# Patient Record
Sex: MALE | Race: WHITE
[De-identification: names, ages, dates, MRNs, and addresses within clinical notes are randomized per-mention and may not be internally consistent; named-entity substitution may affect disease eponyms.]

---

## 2019-06-05 ENCOUNTER — HOSPITAL ENCOUNTER (OUTPATIENT)
Dept: HOSPITAL 92 - SCSULT | Age: 59
Discharge: HOME | End: 2019-06-05
Attending: NURSE PRACTITIONER
Payer: COMMERCIAL

## 2019-06-05 DIAGNOSIS — R10.13: Primary | ICD-10-CM

## 2019-06-05 PROCEDURE — 76700 US EXAM ABDOM COMPLETE: CPT

## 2019-06-05 PROCEDURE — 36415 COLL VENOUS BLD VENIPUNCTURE: CPT

## 2019-06-05 PROCEDURE — 84439 ASSAY OF FREE THYROXINE: CPT

## 2019-06-05 PROCEDURE — 76705 ECHO EXAM OF ABDOMEN: CPT

## 2019-06-05 PROCEDURE — 84443 ASSAY THYROID STIM HORMONE: CPT

## 2019-06-05 PROCEDURE — 85025 COMPLETE CBC W/AUTO DIFF WBC: CPT

## 2019-06-05 PROCEDURE — 80053 COMPREHEN METABOLIC PANEL: CPT

## 2019-06-05 NOTE — ULT
Exam:

Right upper quadrant ultrasound:



HISTORY:

Abdominal pain



COMPARISON:

None



FINDINGS:

Visualized liver:Not well seen,

Gallbladder:Multiple mobile echogenic foci suspicious for small stones without wall thickening or per
icholecystic fluid.

Common bile duct:Within normal limits.

The visualized pancreas and right kidney are unremarkable.

No evidence for abscess or abnormal fluid collection in the right upper quadrant.





IMPRESSION:

Multiple mobile gallbladder foci suspicious for small gallstones.

Somewhat poorly demonstrated liver.

No ductal dilatation.



Reported By: Zaheer Bernardo 

Electronically Signed:  6/5/2019 2:11 PM

## 2019-06-12 ENCOUNTER — HOSPITAL ENCOUNTER (OUTPATIENT)
Dept: HOSPITAL 92 - LABBT | Age: 59
Discharge: HOME | End: 2019-06-12
Attending: SPECIALIST
Payer: COMMERCIAL

## 2019-06-12 DIAGNOSIS — Z01.818: Primary | ICD-10-CM

## 2019-06-12 PROCEDURE — 71046 X-RAY EXAM CHEST 2 VIEWS: CPT

## 2019-06-12 PROCEDURE — 93010 ELECTROCARDIOGRAM REPORT: CPT

## 2019-06-12 PROCEDURE — 93005 ELECTROCARDIOGRAM TRACING: CPT

## 2019-06-12 NOTE — RAD
EXAM:

Chest 2 views:



HISTORY:

Preoperative radiograph



COMPARISON:

8/17/2009



FINDINGS:

There is a normal-sized cardiomediastinal silhouette. There is no evidence of consolidation, mass, or
 pleural effusion. Degenerative changes are seen in the spine.



IMPRESSION:

No evidence of acute cardiopulmonary disease



Reported By: Trevor Ochoa 

Electronically Signed:  6/12/2019 5:00 PM

## 2019-06-14 NOTE — EKG
Test Reason : 

Blood Pressure : ***/*** mmHG

Vent. Rate : 065 BPM     Atrial Rate : 065 BPM

   P-R Int : 220 ms          QRS Dur : 092 ms

    QT Int : 370 ms       P-R-T Axes : 076 079 065 degrees

   QTc Int : 384 ms

 

Sinus rhythm with 1st degree A-V block

Otherwise normal ECG

When compared with ECG of 11-APR-2016 12:45,

No significant change was found

Confirmed by REY FREEMAN ([id number]) on 6/14/2019 9:20:51 PM

 

Referred By:  KIKO           Confirmed By:REY FREEMAN

## 2019-06-18 ENCOUNTER — HOSPITAL ENCOUNTER (OUTPATIENT)
Dept: HOSPITAL 92 - SDC | Age: 59
Discharge: HOME | End: 2019-06-18
Attending: SPECIALIST
Payer: COMMERCIAL

## 2019-06-18 VITALS — BODY MASS INDEX: 32.4 KG/M2

## 2019-06-18 DIAGNOSIS — F17.290: ICD-10-CM

## 2019-06-18 DIAGNOSIS — Z88.8: ICD-10-CM

## 2019-06-18 DIAGNOSIS — Z79.899: ICD-10-CM

## 2019-06-18 DIAGNOSIS — I10: ICD-10-CM

## 2019-06-18 DIAGNOSIS — K81.1: Primary | ICD-10-CM

## 2019-06-18 DIAGNOSIS — Z98.890: ICD-10-CM

## 2019-06-18 DIAGNOSIS — Z88.0: ICD-10-CM

## 2019-06-18 DIAGNOSIS — K21.9: ICD-10-CM

## 2019-06-18 DIAGNOSIS — Z79.82: ICD-10-CM

## 2019-06-18 DIAGNOSIS — E78.5: ICD-10-CM

## 2019-06-18 DIAGNOSIS — K82.8: ICD-10-CM

## 2019-06-18 PROCEDURE — 88304 TISSUE EXAM BY PATHOLOGIST: CPT

## 2019-06-18 PROCEDURE — 0FT44ZZ RESECTION OF GALLBLADDER, PERCUTANEOUS ENDOSCOPIC APPROACH: ICD-10-PCS | Performed by: SPECIALIST

## 2019-06-18 NOTE — OP
DATE OF PROCEDURE:  06/18/2019



PREOPERATIVE DIAGNOSIS:  Symptomatic cholelithiasis.



POSTOPERATIVE DIAGNOSIS:  Symptomatic cholelithiasis.



OPERATION PERFORMED:  Laparoscopic cholecystectomy.



ANESTHESIA:  General endotracheal.



INDICATIONS:  The patient is a 59-year-old white male.  He presents with symptoms

referable to his gallbladder and ultrasound proven cholelithiasis.  He was taken to

the operative room at this time for laparoscopic cholecystectomy. 



PROCEDURE IN DETAIL:  Informed consent was obtained.  The patient was taken to the

operating room where general endotracheal anesthesia was obtained with the patient

in the supine position.  The abdomen was prepped with Betadine and draped in the

usual sterile fashion.  0.25% Marcaine with epinephrine was infiltrated below the

umbilicus and a 10 mm infraumbilical incision was created.  A Veress needle was

passed through this incision into the peritoneal cavity.  A pneumoperitoneum was

established using carbon dioxide up to a pressure of 15 mmHg.  Local anesthetic was

infiltrated and 3 additional 5 mm right upper quadrant incisions were created.

Through the mid incision, a 5 mm port was passed into the peritoneal cavity.  The

camera was passed through this port and under direct vision, an 11 port was passed

through the infraumbilical incision.  The camera was replaced through this port, and

under direct vision, 2 additional 5 mm ports were passed through the incisions

already created. 





The gallbladder was grasped and retracted in a cephalad direction.  Minimal

adhesions were bluntly stripped away from the apex of the gallbladder, and the apex

was retracted laterally and inferiorly.  Careful dissection was carried out to the

apex of the gallbladder to identify the cystic duct and cystic artery.  These were

each carefully dissected circumferentially.  The duct was of normal caliber.  Both

the duct and the artery were divided between clips, leaving 2 on the side to remain

within the abdomen.  The gallbladder was then dissected out of the gallbladder fossa

using electrocautery and removed through the infraumbilical port site.  The fascia

was closed with 0 Vicryl suture and a GraNee needle.  The right upper quadrant was

inspected and irrigated.  All irrigant was aspirated.  All ports and instruments

were removed under direct vision.  Pneumoperitoneum was carefully evacuated.

Additional local anesthetic was infiltrated into each port site.  The skin edges

were approximated with 4-0 Monocryl subcuticular sutures, and Dermabond was placed

externally.  There were no complications.  The patient tolerated the procedure well

and was taken to the recovery room in stable condition. 



FINDINGS:  The patient's gallbladder was without any acute inflammation.  There were

some pericholecystic adhesions, that were taken down uneventfully.  The patient's

duct was small and noninflamed.  Cholangiogram was not obtained.  Preoperative liver

function tests were within normal limits as well. 



There was an area of some bleeding within the liver fossa toward the inferior aspect

of the liver.  This was all controlled appropriately with electrocautery.  The wound

was meticulously hemostatic at the time of closure.  There were no complications.

Blood loss was negligible.  The patient tolerated the procedure well and was taken

to recovery room in stable condition. 







Job ID:  [id number]